# Patient Record
Sex: FEMALE | Race: WHITE
[De-identification: names, ages, dates, MRNs, and addresses within clinical notes are randomized per-mention and may not be internally consistent; named-entity substitution may affect disease eponyms.]

---

## 2019-12-06 ENCOUNTER — HOSPITAL ENCOUNTER (OUTPATIENT)
Dept: HOSPITAL 19 - LDRO | Age: 29
Discharge: HOME | End: 2019-12-06
Attending: OBSTETRICS & GYNECOLOGY
Payer: COMMERCIAL

## 2019-12-06 VITALS — HEART RATE: 73 BPM | TEMPERATURE: 97.6 F | SYSTOLIC BLOOD PRESSURE: 108 MMHG | DIASTOLIC BLOOD PRESSURE: 72 MMHG

## 2019-12-06 VITALS — SYSTOLIC BLOOD PRESSURE: 108 MMHG | HEART RATE: 73 BPM | DIASTOLIC BLOOD PRESSURE: 72 MMHG

## 2019-12-06 VITALS — BODY MASS INDEX: 27.68 KG/M2 | WEIGHT: 152.34 LBS | HEIGHT: 62.01 IN

## 2019-12-06 DIAGNOSIS — O88.113: Primary | ICD-10-CM

## 2019-12-06 DIAGNOSIS — Z3A.37: ICD-10-CM

## 2019-12-06 NOTE — NUR
Patient presents to labor and delivery with complaints of leaking of fluid.
Patient denies any bleeding, denies contractions. Patient placed on monitor,
SVE completed noted 0/50/-3. Patient tolerated well. Aminosure obtained and
noted negative per swab with no color change. Full assesment completed.
Dr.Goodpasture reviews FHR strip, discuss patient history, orders recieved to
discharge patient.

## 2019-12-07 ENCOUNTER — HOSPITAL ENCOUNTER (INPATIENT)
Dept: HOSPITAL 19 - LDRO | Age: 29
LOS: 4 days | Discharge: HOME | End: 2019-12-11
Attending: OBSTETRICS & GYNECOLOGY | Admitting: OBSTETRICS & GYNECOLOGY
Payer: COMMERCIAL

## 2019-12-07 VITALS — BODY MASS INDEX: 28.03 KG/M2 | HEIGHT: 62 IN | WEIGHT: 152.34 LBS

## 2019-12-07 VITALS — HEART RATE: 82 BPM | SYSTOLIC BLOOD PRESSURE: 118 MMHG | DIASTOLIC BLOOD PRESSURE: 73 MMHG | TEMPERATURE: 97.7 F

## 2019-12-07 DIAGNOSIS — Z28.21: ICD-10-CM

## 2019-12-07 DIAGNOSIS — Z3A.37: ICD-10-CM

## 2019-12-07 DIAGNOSIS — O32.3XX0: Primary | ICD-10-CM

## 2019-12-07 NOTE — NUR
2340- Pt arrived on unit via wheelchair escorted by  and with
complaints of SROM at 2200 with clear fluid. Pt reports occasional
contractions, denies vaginal bleeding and reports normal fetal movement. EFM
and toco monitors started. Vital signs WNL. Amnio-test positive. SVE by this
RN FT/50/-2 with clear fluid on exam.
 
0000- Spoke with Dr. Goodpasture. FHR tracing reviewed. Orders for labor
admission received.
 
0005- EFM intermittently tracing FHR. Unable to determine FHR vs maternal HR.
EFM readjusted.
 
0020- Consents reviewed and signed.
 
0040- IV started and labs obtained.

## 2019-12-08 VITALS — DIASTOLIC BLOOD PRESSURE: 71 MMHG | HEART RATE: 86 BPM | SYSTOLIC BLOOD PRESSURE: 125 MMHG

## 2019-12-08 VITALS — DIASTOLIC BLOOD PRESSURE: 73 MMHG | SYSTOLIC BLOOD PRESSURE: 110 MMHG | HEART RATE: 70 BPM

## 2019-12-08 VITALS — HEART RATE: 80 BPM | DIASTOLIC BLOOD PRESSURE: 56 MMHG | SYSTOLIC BLOOD PRESSURE: 101 MMHG

## 2019-12-08 VITALS — SYSTOLIC BLOOD PRESSURE: 116 MMHG | DIASTOLIC BLOOD PRESSURE: 72 MMHG | HEART RATE: 96 BPM

## 2019-12-08 VITALS — HEART RATE: 86 BPM | SYSTOLIC BLOOD PRESSURE: 118 MMHG | DIASTOLIC BLOOD PRESSURE: 67 MMHG

## 2019-12-08 VITALS — HEART RATE: 74 BPM | SYSTOLIC BLOOD PRESSURE: 106 MMHG | DIASTOLIC BLOOD PRESSURE: 55 MMHG

## 2019-12-08 VITALS — DIASTOLIC BLOOD PRESSURE: 57 MMHG | SYSTOLIC BLOOD PRESSURE: 104 MMHG | HEART RATE: 78 BPM

## 2019-12-08 VITALS — SYSTOLIC BLOOD PRESSURE: 101 MMHG | DIASTOLIC BLOOD PRESSURE: 55 MMHG | HEART RATE: 61 BPM

## 2019-12-08 VITALS — TEMPERATURE: 97.5 F | DIASTOLIC BLOOD PRESSURE: 70 MMHG | HEART RATE: 68 BPM | SYSTOLIC BLOOD PRESSURE: 115 MMHG

## 2019-12-08 VITALS — SYSTOLIC BLOOD PRESSURE: 117 MMHG | HEART RATE: 86 BPM | DIASTOLIC BLOOD PRESSURE: 65 MMHG

## 2019-12-08 VITALS — DIASTOLIC BLOOD PRESSURE: 60 MMHG | SYSTOLIC BLOOD PRESSURE: 104 MMHG | HEART RATE: 71 BPM

## 2019-12-08 VITALS — SYSTOLIC BLOOD PRESSURE: 109 MMHG | HEART RATE: 85 BPM | DIASTOLIC BLOOD PRESSURE: 63 MMHG

## 2019-12-08 VITALS — DIASTOLIC BLOOD PRESSURE: 55 MMHG | HEART RATE: 64 BPM | SYSTOLIC BLOOD PRESSURE: 97 MMHG

## 2019-12-08 VITALS — TEMPERATURE: 97.9 F | HEART RATE: 70 BPM | DIASTOLIC BLOOD PRESSURE: 67 MMHG | SYSTOLIC BLOOD PRESSURE: 102 MMHG

## 2019-12-08 VITALS — DIASTOLIC BLOOD PRESSURE: 70 MMHG | SYSTOLIC BLOOD PRESSURE: 116 MMHG | HEART RATE: 71 BPM

## 2019-12-08 VITALS — DIASTOLIC BLOOD PRESSURE: 63 MMHG | HEART RATE: 87 BPM | SYSTOLIC BLOOD PRESSURE: 110 MMHG

## 2019-12-08 VITALS — HEART RATE: 75 BPM | DIASTOLIC BLOOD PRESSURE: 58 MMHG | SYSTOLIC BLOOD PRESSURE: 108 MMHG

## 2019-12-08 VITALS — DIASTOLIC BLOOD PRESSURE: 73 MMHG | HEART RATE: 82 BPM | SYSTOLIC BLOOD PRESSURE: 121 MMHG

## 2019-12-08 VITALS — DIASTOLIC BLOOD PRESSURE: 75 MMHG | SYSTOLIC BLOOD PRESSURE: 120 MMHG | HEART RATE: 86 BPM

## 2019-12-08 VITALS — DIASTOLIC BLOOD PRESSURE: 77 MMHG | SYSTOLIC BLOOD PRESSURE: 114 MMHG | HEART RATE: 85 BPM

## 2019-12-08 VITALS — DIASTOLIC BLOOD PRESSURE: 60 MMHG | HEART RATE: 88 BPM | SYSTOLIC BLOOD PRESSURE: 116 MMHG

## 2019-12-08 VITALS — SYSTOLIC BLOOD PRESSURE: 105 MMHG | HEART RATE: 80 BPM | DIASTOLIC BLOOD PRESSURE: 57 MMHG

## 2019-12-08 VITALS — HEART RATE: 74 BPM | DIASTOLIC BLOOD PRESSURE: 51 MMHG | SYSTOLIC BLOOD PRESSURE: 94 MMHG

## 2019-12-08 VITALS — HEART RATE: 63 BPM | SYSTOLIC BLOOD PRESSURE: 106 MMHG | TEMPERATURE: 98.1 F | DIASTOLIC BLOOD PRESSURE: 55 MMHG

## 2019-12-08 VITALS — SYSTOLIC BLOOD PRESSURE: 115 MMHG | HEART RATE: 62 BPM | DIASTOLIC BLOOD PRESSURE: 70 MMHG

## 2019-12-08 VITALS — DIASTOLIC BLOOD PRESSURE: 55 MMHG | HEART RATE: 85 BPM | SYSTOLIC BLOOD PRESSURE: 91 MMHG

## 2019-12-08 VITALS — DIASTOLIC BLOOD PRESSURE: 69 MMHG | HEART RATE: 96 BPM | SYSTOLIC BLOOD PRESSURE: 126 MMHG

## 2019-12-08 VITALS — SYSTOLIC BLOOD PRESSURE: 114 MMHG | HEART RATE: 98 BPM | DIASTOLIC BLOOD PRESSURE: 71 MMHG

## 2019-12-08 VITALS — HEART RATE: 74 BPM | DIASTOLIC BLOOD PRESSURE: 55 MMHG | SYSTOLIC BLOOD PRESSURE: 94 MMHG

## 2019-12-08 VITALS — DIASTOLIC BLOOD PRESSURE: 63 MMHG | SYSTOLIC BLOOD PRESSURE: 120 MMHG | HEART RATE: 79 BPM

## 2019-12-08 VITALS — DIASTOLIC BLOOD PRESSURE: 58 MMHG | HEART RATE: 64 BPM | SYSTOLIC BLOOD PRESSURE: 100 MMHG

## 2019-12-08 VITALS — SYSTOLIC BLOOD PRESSURE: 125 MMHG | DIASTOLIC BLOOD PRESSURE: 71 MMHG | HEART RATE: 86 BPM

## 2019-12-08 VITALS — HEART RATE: 75 BPM | DIASTOLIC BLOOD PRESSURE: 55 MMHG | SYSTOLIC BLOOD PRESSURE: 96 MMHG

## 2019-12-08 VITALS — TEMPERATURE: 98.1 F

## 2019-12-08 VITALS — SYSTOLIC BLOOD PRESSURE: 108 MMHG | HEART RATE: 104 BPM | DIASTOLIC BLOOD PRESSURE: 55 MMHG

## 2019-12-08 VITALS — SYSTOLIC BLOOD PRESSURE: 108 MMHG | HEART RATE: 85 BPM | DIASTOLIC BLOOD PRESSURE: 59 MMHG

## 2019-12-08 VITALS — SYSTOLIC BLOOD PRESSURE: 100 MMHG | DIASTOLIC BLOOD PRESSURE: 56 MMHG | HEART RATE: 65 BPM

## 2019-12-08 VITALS — SYSTOLIC BLOOD PRESSURE: 103 MMHG | HEART RATE: 74 BPM | DIASTOLIC BLOOD PRESSURE: 63 MMHG

## 2019-12-08 VITALS — DIASTOLIC BLOOD PRESSURE: 59 MMHG | SYSTOLIC BLOOD PRESSURE: 107 MMHG | HEART RATE: 85 BPM

## 2019-12-08 VITALS — DIASTOLIC BLOOD PRESSURE: 77 MMHG | SYSTOLIC BLOOD PRESSURE: 114 MMHG | HEART RATE: 96 BPM

## 2019-12-08 VITALS — HEART RATE: 96 BPM | SYSTOLIC BLOOD PRESSURE: 117 MMHG | DIASTOLIC BLOOD PRESSURE: 63 MMHG

## 2019-12-08 VITALS — DIASTOLIC BLOOD PRESSURE: 61 MMHG | HEART RATE: 84 BPM | SYSTOLIC BLOOD PRESSURE: 103 MMHG

## 2019-12-08 VITALS — SYSTOLIC BLOOD PRESSURE: 112 MMHG | DIASTOLIC BLOOD PRESSURE: 56 MMHG | HEART RATE: 81 BPM

## 2019-12-08 VITALS — SYSTOLIC BLOOD PRESSURE: 105 MMHG | TEMPERATURE: 98.1 F | DIASTOLIC BLOOD PRESSURE: 55 MMHG | HEART RATE: 74 BPM

## 2019-12-08 VITALS — HEART RATE: 91 BPM | SYSTOLIC BLOOD PRESSURE: 111 MMHG | DIASTOLIC BLOOD PRESSURE: 62 MMHG

## 2019-12-08 VITALS — HEART RATE: 63 BPM | DIASTOLIC BLOOD PRESSURE: 57 MMHG | SYSTOLIC BLOOD PRESSURE: 100 MMHG

## 2019-12-08 VITALS — SYSTOLIC BLOOD PRESSURE: 99 MMHG | HEART RATE: 58 BPM | DIASTOLIC BLOOD PRESSURE: 54 MMHG

## 2019-12-08 VITALS — DIASTOLIC BLOOD PRESSURE: 64 MMHG | HEART RATE: 103 BPM | SYSTOLIC BLOOD PRESSURE: 123 MMHG

## 2019-12-08 VITALS — TEMPERATURE: 98 F

## 2019-12-08 LAB
BASOPHILS # BLD: 0 10*3/UL (ref 0–0.2)
BASOPHILS NFR BLD AUTO: 0.3 % (ref 0–2)
EOSINOPHIL # BLD: 0.1 10*3/UL (ref 0–0.7)
EOSINOPHIL NFR BLD: 0.6 % (ref 0–4)
ERYTHROCYTE [DISTWIDTH] IN BLOOD BY AUTOMATED COUNT: 14.7 % (ref 11.5–14.5)
GRANULOCYTES # BLD AUTO: 61.3 % (ref 42.2–75.2)
HCT VFR BLD AUTO: 34.9 % (ref 37–47)
HGB BLD-MCNC: 10.9 G/DL (ref 12.5–16)
LYMPHOCYTES # BLD: 2.2 10*3/UL (ref 1.2–3.4)
LYMPHOCYTES NFR BLD: 27.6 % (ref 20–51)
MCH RBC QN AUTO: 26 PG (ref 27–31)
MCHC RBC AUTO-ENTMCNC: 31 G/DL (ref 33–37)
MCV RBC AUTO: 83 FL (ref 80–100)
MONOCYTES # BLD: 0.8 10*3/UL (ref 0.1–0.6)
MONOCYTES NFR BLD AUTO: 9.9 % (ref 1.7–9.3)
NEUTROPHILS # BLD: 4.9 10*3/UL (ref 1.4–6.5)
PLATELET # BLD AUTO: 216 K/MM3 (ref 130–400)
PMV BLD AUTO: 10.6 FL (ref 7.4–10.4)
RBC # BLD AUTO: 4.21 M/MM3 (ref 4.1–5.3)

## 2019-12-08 NOTE — NUR
Difficulty tracing fetal heart rate during early decelerations with external
monitor.  Discussed option for FSE with patient, patient agrees.  Place per
protocol without difficulty by TEAGAN Velasquez RN.  SVE 3-4/90/-1.

## 2019-12-08 NOTE — NUR
1155- Physician requested to perform SVE. Physician on unit and has been
reviewing FHR strip.
 
1200- SVE per provider  and face presentation noted at this time.
Discussing plan of care with patient for indicated  section. Patient
agrees, see physician documentation. JUAN MIGUEL Ricketts CRNA notified.
 
1205- FSE removed prior to  section. JUAN MIGUEL Ricketts CRNA at bedside. See
anesthesia record. Ultrasound monitor replaced. Patient prepped for surgery,
preop shave completed and periprep completed.
 
1211- Patient taken off EFM and transferred to OR. See intraoperative report.

## 2019-12-08 NOTE — NUR
Dr. Pemberton on unit. Reviews FHR strip. Will recheck patient shortly. Orders
to continue to monitor at this time.

## 2019-12-08 NOTE — NUR
0420- JABIER Gordon at the bedside discussing epidural placement with
scoliosis.
 
0430- Pt off EFM to the bathroom.
 
0435- NICANOR.JABIER Ricketts at the bedside for epidural placement. Pt sitting on the
edge of the bed. SPO2 monitor started. EFM intermittently tracing maternal HR
as coorelates with SPO2.
 
0450- EFM intermittently tracing maternal HR as coorelates with SPO2. RN
remains at bedside during epidural placement. Frequent attempts to adjust EFM
but difficult due to maternal position for epidural placement.
 
0505- RN remains at the bedside with frequent attempts to adjust EFM during
epidural placement.
 
0520- RN remains at the bedside with frequent attempts to adjust EFM during
epidural placement.
 
0530- Test dose done per CRNA. See anesthesia record for details.
 
0540- Pt repositioned to supine position with left wedge. EFM and toco
monitors adjusted.

## 2019-12-08 NOTE — NUR
1035- Patient repositioned LL. FHR noted intermittently to 70 bpm with
accelerations present. Will have physician review.
 
1040- SVE 5/90/0. Patient repositioned RL following exam. FHR deceleration
noted to 60 bpm following repositioning, patient repositoned LL, oxygen via
simple mask at 10L applied. See physician notification. Patient and 
updated on plan of care.

## 2019-12-09 VITALS — DIASTOLIC BLOOD PRESSURE: 61 MMHG | SYSTOLIC BLOOD PRESSURE: 111 MMHG | HEART RATE: 80 BPM | TEMPERATURE: 98.1 F

## 2019-12-09 VITALS — TEMPERATURE: 97.5 F | DIASTOLIC BLOOD PRESSURE: 63 MMHG | HEART RATE: 78 BPM | SYSTOLIC BLOOD PRESSURE: 103 MMHG

## 2019-12-09 VITALS — HEART RATE: 68 BPM | SYSTOLIC BLOOD PRESSURE: 117 MMHG | DIASTOLIC BLOOD PRESSURE: 84 MMHG | TEMPERATURE: 97.4 F

## 2019-12-09 VITALS — SYSTOLIC BLOOD PRESSURE: 103 MMHG | HEART RATE: 76 BPM | TEMPERATURE: 98.4 F | DIASTOLIC BLOOD PRESSURE: 68 MMHG

## 2019-12-09 VITALS — DIASTOLIC BLOOD PRESSURE: 56 MMHG | HEART RATE: 62 BPM | SYSTOLIC BLOOD PRESSURE: 97 MMHG

## 2019-12-09 VITALS — DIASTOLIC BLOOD PRESSURE: 63 MMHG | HEART RATE: 84 BPM | SYSTOLIC BLOOD PRESSURE: 115 MMHG | TEMPERATURE: 98 F

## 2019-12-09 NOTE — NUR
Pt up to the bathroom with standby assist and without complications. Watters
removed. Pericare done. Assisted pt back to bed.

## 2019-12-09 NOTE — NUR
Pt attempted to ambulate to the bathroom. Pt pale and reports feeling
lightheaded and dizzy while sitting on the edge of the bed. Pt was able to
stand next to the bed with assist x1 but not able to ambulate to the bathroom.
Assisted pt back to bed. Pericare done.

## 2019-12-10 VITALS — DIASTOLIC BLOOD PRESSURE: 68 MMHG | SYSTOLIC BLOOD PRESSURE: 114 MMHG | TEMPERATURE: 98.1 F | HEART RATE: 76 BPM

## 2019-12-10 VITALS — DIASTOLIC BLOOD PRESSURE: 78 MMHG | TEMPERATURE: 98.2 F | SYSTOLIC BLOOD PRESSURE: 124 MMHG | HEART RATE: 84 BPM

## 2019-12-11 VITALS — TEMPERATURE: 98.2 F | HEART RATE: 72 BPM | SYSTOLIC BLOOD PRESSURE: 109 MMHG | DIASTOLIC BLOOD PRESSURE: 62 MMHG

## 2019-12-11 NOTE — NUR
Patient discharge instructions reviewed with with patient and . Script
for percocet given and explained. Patient appointments reviewed. Patient and
 verbalize understanding.